# Patient Record
Sex: FEMALE | Race: WHITE
[De-identification: names, ages, dates, MRNs, and addresses within clinical notes are randomized per-mention and may not be internally consistent; named-entity substitution may affect disease eponyms.]

---

## 2020-01-25 ENCOUNTER — HOSPITAL ENCOUNTER (INPATIENT)
Dept: HOSPITAL 1 - ED | Age: 85
LOS: 7 days | Discharge: HOME | DRG: 871 | End: 2020-02-01
Attending: FAMILY MEDICINE | Admitting: FAMILY MEDICINE
Payer: COMMERCIAL

## 2020-01-25 VITALS
HEIGHT: 62 IN | HEIGHT: 62 IN | BODY MASS INDEX: 21.71 KG/M2 | BODY MASS INDEX: 21.71 KG/M2 | WEIGHT: 118 LBS | WEIGHT: 118 LBS

## 2020-01-25 DIAGNOSIS — I11.0: ICD-10-CM

## 2020-01-25 DIAGNOSIS — E03.9: ICD-10-CM

## 2020-01-25 DIAGNOSIS — Z87.891: ICD-10-CM

## 2020-01-25 DIAGNOSIS — A41.9: Primary | ICD-10-CM

## 2020-01-25 DIAGNOSIS — E78.5: ICD-10-CM

## 2020-01-25 DIAGNOSIS — N17.0: ICD-10-CM

## 2020-01-25 DIAGNOSIS — F03.90: ICD-10-CM

## 2020-01-25 DIAGNOSIS — G40.909: ICD-10-CM

## 2020-01-25 DIAGNOSIS — E86.0: ICD-10-CM

## 2020-01-25 DIAGNOSIS — J96.01: ICD-10-CM

## 2020-01-25 DIAGNOSIS — I73.9: ICD-10-CM

## 2020-01-25 DIAGNOSIS — R65.20: ICD-10-CM

## 2020-01-25 DIAGNOSIS — I21.A1: ICD-10-CM

## 2020-01-25 DIAGNOSIS — E43: ICD-10-CM

## 2020-01-25 DIAGNOSIS — I16.0: ICD-10-CM

## 2020-01-25 DIAGNOSIS — H91.8X2: ICD-10-CM

## 2020-01-25 DIAGNOSIS — J69.0: ICD-10-CM

## 2020-01-25 DIAGNOSIS — J44.9: ICD-10-CM

## 2020-01-25 LAB
ALBUMIN SERPL-MCNC: 2.5 G/DL (ref 3.4–5)
ALP SERPL-CCNC: 165 U/L (ref 46–116)
ALT SERPL-CCNC: 39 U/L (ref 14–59)
AST SERPL-CCNC: 37 U/L (ref 15–37)
BASOPHILS NFR BLD: 0 % (ref 0–2)
BILIRUB SERPL-MCNC: 0.9 MG/DL (ref 0.2–1)
BUN SERPL-MCNC: 22 MG/DL (ref 7–18)
CALCIUM SERPL-MCNC: 8.7 MG/DL (ref 8.5–10.1)
CHLORIDE SERPL-SCNC: 105 MMOL/L (ref 98–107)
CO2 SERPL-SCNC: 25 MMOL/L (ref 21–32)
CREAT SERPL-MCNC: 1.4 MG/DL (ref 0.6–1)
ERYTHROCYTE [DISTWIDTH] IN BLOOD BY AUTOMATED COUNT: 14.1 % (ref 11.5–14.5)
GFR SERPLBLD BASED ON 1.73 SQ M-ARVRAT: (no result) ML/MIN
GLUCOSE SERPL-MCNC: 172 MG/DL (ref 74–106)
PLATELET # BLD: 224 X10^3MCL (ref 130–400)
POTASSIUM SERPL-SCNC: 3.6 MMOL/L (ref 3.5–5.1)
PROT SERPL-MCNC: 5.8 G/DL (ref 6.4–8.2)
SODIUM SERPL-SCNC: 142 MMOL/L (ref 136–145)

## 2020-01-25 PROCEDURE — G0378 HOSPITAL OBSERVATION PER HR: HCPCS

## 2020-01-25 NOTE — NUR
REC'D A 87/F IN [facility] WITH C/O SOB WITH DRY COUGH X 2 DAYS. HX OF COPD,
CURRENTLY A SMOKER, AND ASTHMA. PT AAOX4, RESP EVEN AND LABORED, ARELI
WHEEZING NOTED, PLACED ON 02 @1L.  PT ON CM, IN MILD DISTRESS.

## 2020-01-26 VITALS — DIASTOLIC BLOOD PRESSURE: 56 MMHG | SYSTOLIC BLOOD PRESSURE: 124 MMHG

## 2020-01-26 VITALS — SYSTOLIC BLOOD PRESSURE: 119 MMHG | DIASTOLIC BLOOD PRESSURE: 54 MMHG

## 2020-01-26 VITALS — DIASTOLIC BLOOD PRESSURE: 40 MMHG | SYSTOLIC BLOOD PRESSURE: 118 MMHG

## 2020-01-26 VITALS — DIASTOLIC BLOOD PRESSURE: 63 MMHG | SYSTOLIC BLOOD PRESSURE: 126 MMHG

## 2020-01-26 VITALS — DIASTOLIC BLOOD PRESSURE: 42 MMHG | SYSTOLIC BLOOD PRESSURE: 128 MMHG

## 2020-01-26 VITALS — DIASTOLIC BLOOD PRESSURE: 48 MMHG | SYSTOLIC BLOOD PRESSURE: 99 MMHG

## 2020-01-26 LAB
BASOPHILS NFR BLD: 0 % (ref 0–2)
BUN SERPL-MCNC: 21 MG/DL (ref 7–18)
CALCIUM SERPL-MCNC: 7.8 MG/DL (ref 8.5–10.1)
CHLORIDE SERPL-SCNC: 108 MMOL/L (ref 98–107)
CHOLEST SERPL-MCNC: 92 MG/DL (ref ?–200)
CHOLEST/HDLC SERPL: 1.9 MG/DL
CO2 SERPL-SCNC: 20.9 MMOL/L (ref 21–32)
CREAT SERPL-MCNC: 1.5 MG/DL (ref 0.6–1)
ERYTHROCYTE [DISTWIDTH] IN BLOOD BY AUTOMATED COUNT: 14.6 % (ref 11.5–14.5)
GFR SERPLBLD BASED ON 1.73 SQ M-ARVRAT: (no result) ML/MIN
GLUCOSE SERPL-MCNC: 267 MG/DL (ref 74–106)
HDLC SERPL-MCNC: 49 MG/DL (ref 40–60)
MAGNESIUM SERPL-MCNC: 1.8 MG/DL (ref 1.8–2.4)
PHOSPHATE SERPL-MCNC: 2.3 MG/DL (ref 2.5–4.9)
PLATELET # BLD: 195 X10^3MCL (ref 130–400)
POTASSIUM SERPL-SCNC: 3.5 MMOL/L (ref 3.5–5.1)
SODIUM SERPL-SCNC: 142 MMOL/L (ref 136–145)
T3 SERPL-MCNC: 0.36 NG/ML
T3RU NFR SERPL: 41 % UPTAKE (ref 30–39)
T4 FREE SERPL-MCNC: 1.7 NG/DL (ref 0.76–1.46)
T4 SERPL-MCNC: 9.6 UG/DL (ref 4.7–13.3)
T4/T3 UPTAKE INDEX SERPL: 3.9 UG/DL (ref 1.4–4.5)
TRIGL SERPL-MCNC: 66 MG/DL (ref ?–150)

## 2020-01-26 NOTE — NUR
DOCTOR PURNIMA AND MEDICAL TEAM AT BEDSIDE FOR AM ROUND. PATIENT AND HER SON AT
BEDSIDE MADE AWARE OF CURRENT CONDITION AND PLAN OF CARE.

## 2020-01-26 NOTE — NUR
RECEIVED PT IN BED, ALERT AD ORIENTED. ABLE TO VERBALIZE NEEDS. RESP. EVEN AND
UNLABORED. ON 02 AT 3L/MIN VIA NC, LUNG SOUNDS DIM. HOB ELEVATED , NO ACUTE
DISTRESS NOTED. AFEBRILE AND VITAL SIGNS STABLE. ST WITH OCCA. PACS ON THE
MONITOR, DENIES CP OR ANY DISCOMFORT AT THIS TIME. HL TO LFA, INTACT AND
PATENT. VOIDS FREELY VIA BSC WITH ASSIST. HS CARE DONE.. CALL LIGHT WITHIN
REACH. WILL CONTINUE TO MONITOR.

## 2020-01-26 NOTE — NUR
NOTED NEW ORDER FOR IVF NS 1 LITER BOLUS IN 1 HOUR. IVF NS STARTED AT THIS
TIME. IV SITE TO RIGHT HAND INTACT AND PATENT. DENIES PAIN. O2 2LPM N/C
MAINTAINED.

## 2020-01-26 NOTE — NUR
STATED IV SITE RIGHT HAND HURTING, NO INFILTRATION OR ERYTHEMA NOTED, IV
CATHETER REMOVED WITH CATHETER INTACT, DRSG APPLIED. IV CATHETER#22 INSERTED
TO LFA WITH GOOD BLD RETURNED AND FLUSHED WELL.

## 2020-01-26 NOTE — NUR
ASSISTED TO BSC NOTED WITH MILD SOB. VOIDED. ASSISTED BACK TO BED. O2 2LPM N/C
MAINTAINED. DENIES PAIN. SON AT BEDSIDE.

## 2020-01-26 NOTE — NUR
PT CONTINUES TO HAVE FREQUENT RUNS OF PACS, HR INCREASES TO 180S AND DROPS
BACK DOWN TO 120S. DR. BREWSTER MADE AWARE. NO FURTHER ORDERS AT THIS TIME. WILL
CONTINUE TO MONITOR.

## 2020-01-26 NOTE — NUR
RECIEVED PT FROM [facility]. PT RESTING IN BED. RR EVEN AND UNLABORED, NO SIGNS
OF ACUTE DISTRESS. BED IN LOWEST POSITION AND CALL LIGHT WITHIN REACH. WILL
CONTINUE TO MONITOR.

## 2020-01-26 NOTE — NUR
SEEN IN BED AAOX4. ON O2 2LPM N/C. NO RESP DISTRESS NOTED. RT PROTOCAL.
DIMINISHED LUNG SOUND. IVF NS TO RT WIRST INFUSING AT 126ML/HR. DENIES PAIN AT
THIS TIME. CALL LIGHT PLACED WITHIN EASY REACH. SIDERAILS UP X2.

## 2020-01-27 VITALS — SYSTOLIC BLOOD PRESSURE: 114 MMHG | DIASTOLIC BLOOD PRESSURE: 55 MMHG

## 2020-01-27 VITALS — SYSTOLIC BLOOD PRESSURE: 110 MMHG | DIASTOLIC BLOOD PRESSURE: 41 MMHG

## 2020-01-27 VITALS — SYSTOLIC BLOOD PRESSURE: 120 MMHG | DIASTOLIC BLOOD PRESSURE: 54 MMHG

## 2020-01-27 VITALS — DIASTOLIC BLOOD PRESSURE: 75 MMHG | SYSTOLIC BLOOD PRESSURE: 126 MMHG

## 2020-01-27 VITALS — DIASTOLIC BLOOD PRESSURE: 72 MMHG | SYSTOLIC BLOOD PRESSURE: 117 MMHG

## 2020-01-27 LAB
BASOPHILS NFR BLD: 0 % (ref 0–2)
BUN SERPL-MCNC: 28 MG/DL (ref 7–18)
CALCIUM SERPL-MCNC: 8.7 MG/DL (ref 8.5–10.1)
CHLORIDE SERPL-SCNC: 108 MMOL/L (ref 98–107)
CO2 SERPL-SCNC: 21.8 MMOL/L (ref 21–32)
CREAT SERPL-MCNC: 1.5 MG/DL (ref 0.6–1)
DACRYOCYTES BLD QL SMEAR: (no result)
ERYTHROCYTE [DISTWIDTH] IN BLOOD BY AUTOMATED COUNT: 13.9 % (ref 11.5–14.5)
GFR SERPLBLD BASED ON 1.73 SQ M-ARVRAT: (no result) ML/MIN
GLUCOSE SERPL-MCNC: 180 MG/DL (ref 74–106)
MAGNESIUM SERPL-MCNC: 2 MG/DL (ref 1.8–2.4)
MONOCYTES NFR BLD: 1 % (ref 0–7)
NEUTS BAND NFR BLD: 10 % (ref 0–10)
NEUTS SEG NFR BLD MANUAL: 84 % (ref 37–75)
PHOSPHATE SERPL-MCNC: 3.4 MG/DL (ref 2.5–4.9)
PLAT MORPH BLD: (no result)
PLATELET # BLD: 216 X10^3MCL (ref 130–400)
POTASSIUM SERPL-SCNC: 3.8 MMOL/L (ref 3.5–5.1)
RBC MORPH BLD: (no result)
SODIUM SERPL-SCNC: 141 MMOL/L (ref 136–145)

## 2020-01-27 NOTE — NUR
NO COMPLAINTS NOTED AT THIS TIME. RESTING QUIETLY. EYES CLOSED, APPEARS
ASLEEP, EASILY AROUSABLE. 02 IN PLACE, NO SOB NOTED. NO ACUTE DISTRESS NOTED.
CALL LIGHT WITHIN REACH. WILL CONTINUE TO MONITOR.

## 2020-01-27 NOTE — NUR
AAO TIMES 4. TELE # 26 . LUNGS CLEAR BUT SOMEWHAT DIMINISHED BUE,
DIMINISHED BLL. O2 3L NC, O2 SAT 91%. BS'S ACTIVE TIMES 4. PERIPHERAL PULSES
PALPABLE. NO EDEMA. IV SITE CDI. COOPERATIVE. GOOD APPETITE. NO C/O PAIN.

## 2020-01-27 NOTE — NUR
RECEIVED PT IN BED AWAKE, ALERT, ORIENTED X4. SPEECH CLEAR. ABLE TO MAKE NEEDS
KNOWN. PT IS [place] TO LEFT EAR. NO CHEST PAIN NOTED. DENIES PRESSURE. LUNG
SOUNDS DIMINISHED, PT ON 3L O2 VIA N/C. PT ON RT PROTOCAL. BS ACTIVE IN ALL
FOUR QUADS. NO ABD PAIN NOTED. PT USES BEDPAN BUT IS INCONTINENT OF URINE AT
TIMES. GENERALIZED WEAKNESS NOTED. BEDREST FOR NOW. FULL ROM, PASSIVE. ABLE TO
REPOSITION SELF WITH SOME ASSISTANCE.  PT ON SEIZURE PRECAUTIONS. ECCHYMOSIS
TO BUE, STERI STRIPS TO LFA, DRY BLOOD NOTED. IV TO RIGHT HAND, INTACT. SHIFT
ASSESSMENT COMPLETED. CALL LIGHT WITHIN REACH. BED IS IN LOWEST POSITION. WILL
CONTINUE TO MONITOR CLOSELY.

## 2020-01-27 NOTE — NUR
AAO TIMES 4. MED SURG PATIENT. SOB WITH ACTIVITY. O2 3L NC, O2 SAT 94%. BS'S
ACTIVE TIMES 4. IV SITE CDI. COOPERATIVE. NO C/O PAIN. EATING DINNER.

## 2020-01-27 NOTE — NUR
AFEBRILE AND VITAL SIGNS STABLE. RESP. EVEN AND UNLABORED. 02 IN PLACE, OCCA.
NON PRODUCTIVE COUGH NOTED. DUE MEDS GIVEN AS ORDERED, CHEMA. WELL. VOIDING
FREELY VIA BSC WITH ASSIST. KEPT COMFORTABLE. CALL LIGHT WITHIN REACH. WILL
CONTINUE TO MONITOR.

## 2020-01-28 VITALS — SYSTOLIC BLOOD PRESSURE: 131 MMHG | DIASTOLIC BLOOD PRESSURE: 69 MMHG

## 2020-01-28 VITALS — SYSTOLIC BLOOD PRESSURE: 110 MMHG | DIASTOLIC BLOOD PRESSURE: 63 MMHG

## 2020-01-28 VITALS — SYSTOLIC BLOOD PRESSURE: 121 MMHG | DIASTOLIC BLOOD PRESSURE: 42 MMHG

## 2020-01-28 VITALS — DIASTOLIC BLOOD PRESSURE: 56 MMHG | SYSTOLIC BLOOD PRESSURE: 123 MMHG

## 2020-01-28 VITALS — DIASTOLIC BLOOD PRESSURE: 52 MMHG | SYSTOLIC BLOOD PRESSURE: 107 MMHG

## 2020-01-28 LAB
BASOPHILS NFR BLD: 0.1 % (ref 0–2)
BUN SERPL-MCNC: 37 MG/DL (ref 7–18)
CALCIUM SERPL-MCNC: 8.5 MG/DL (ref 8.5–10.1)
CHLORIDE SERPL-SCNC: 105 MMOL/L (ref 98–107)
CO2 SERPL-SCNC: 22.9 MMOL/L (ref 21–32)
CREAT SERPL-MCNC: 1.6 MG/DL (ref 0.6–1)
ERYTHROCYTE [DISTWIDTH] IN BLOOD BY AUTOMATED COUNT: 14.3 % (ref 11.5–14.5)
GFR SERPLBLD BASED ON 1.73 SQ M-ARVRAT: (no result) ML/MIN
GLUCOSE SERPL-MCNC: 156 MG/DL (ref 74–106)
PLATELET # BLD: 242 X10^3MCL (ref 130–400)
POTASSIUM SERPL-SCNC: 4.1 MMOL/L (ref 3.5–5.1)
SODIUM SERPL-SCNC: 138 MMOL/L (ref 136–145)

## 2020-01-28 NOTE — NUR
SHE WAS PICKING AT THE STERI STRIPS ON HER LFA. THE SON ASKED IT TO BE
COVERED. I REMOVED THE STERI STRIPS CAREFULLY AND IRRIGATED WITH NS, THEN
DRIED IT THEN APPLIED VERSATEL DRESSING FOR SKIN TEARS. THEN I APPLIED A GAUZE
DRESSING WRAP TO COVER UP THE SITE FROM HER. SITE CDI.

## 2020-01-28 NOTE — NUR
PATIENT AWAKE, ALERT, ORIENTED X3. HARD OF HEARING TO LEFT EAR. RESPIRATION
EVEN AND UNLABORED, ON O2 2L PER NASAL CANNULA. SALINE LOCK TO RIGHT HAND
PATENT AND INTACT. NO EDEMA NOTED. LBM 1/25/20. INCONTINENT OF URINE AT TIMES,
USES BEDPAN. GENERALIZED WEAKNESS TO EXTREMITIES. ECCHYMOSIS TO  BUE, SKIN
TEAR TO LFA COVERED WITH D/I DRESSING. DENIES PAIN AT THIS TIME. WILL CONTINUE
TO MONITOR.

## 2020-01-28 NOTE — NUR
AAO TO PERSON AND PLACE. MED SURG PATIENT. RFA IV SITE CDI. HER SON VISITED
TODAY. LFA IV DRESSING CDI. NO SOB. NO C/O PAIN. COOPERATIVE. WATCHING TV.

## 2020-01-28 NOTE — NUR
PT AWAKE, TRYING TO GET OOB, ORIENTED PT TO ROOM AND SURROUNDINGS. INSTRUCTED
PT NOT TO GET OOB WITHOUT ASSISTANCE. BED ALARM ON. CALL LIGHT WITHIN REACH.
WILL CONTINUE TO MONITOR CLOSELY.

## 2020-01-28 NOTE — NUR
LINEN AND GOWN CHANGED. ALL DUE MEDS GIVEN AS ORDERED. PT RESTING IN BED. WILL
ENDORSE TO INCOMING SHIFT.

## 2020-01-28 NOTE — NUR
AAO TIMES 4. MED SURG PATIENT. LUNGS DIMINISHED BILATERALLY. O2 SAT ON 2L NC
93%. BS'S ACTIVE TIMES 4. [place] WITH GENERALIZED WEAKNESS. PERIPHERAL PULSES
PALPABLE.NO EDEMA. ON HEPARIN SQ. COOPERATIVE. NO C/O PAIN.

## 2020-01-29 VITALS — SYSTOLIC BLOOD PRESSURE: 134 MMHG | DIASTOLIC BLOOD PRESSURE: 65 MMHG

## 2020-01-29 VITALS — SYSTOLIC BLOOD PRESSURE: 121 MMHG | DIASTOLIC BLOOD PRESSURE: 58 MMHG

## 2020-01-29 VITALS — SYSTOLIC BLOOD PRESSURE: 128 MMHG | DIASTOLIC BLOOD PRESSURE: 53 MMHG

## 2020-01-29 VITALS — SYSTOLIC BLOOD PRESSURE: 119 MMHG | DIASTOLIC BLOOD PRESSURE: 66 MMHG

## 2020-01-29 VITALS — DIASTOLIC BLOOD PRESSURE: 58 MMHG | SYSTOLIC BLOOD PRESSURE: 121 MMHG

## 2020-01-29 VITALS — SYSTOLIC BLOOD PRESSURE: 134 MMHG | DIASTOLIC BLOOD PRESSURE: 67 MMHG

## 2020-01-29 LAB
BASOPHILS NFR BLD: 0 % (ref 0–2)
BUN SERPL-MCNC: 41 MG/DL (ref 7–18)
CALCIUM SERPL-MCNC: 8.5 MG/DL (ref 8.5–10.1)
CHLORIDE SERPL-SCNC: 109 MMOL/L (ref 98–107)
CO2 SERPL-SCNC: 25.5 MMOL/L (ref 21–32)
CREAT SERPL-MCNC: 1.6 MG/DL (ref 0.6–1)
ERYTHROCYTE [DISTWIDTH] IN BLOOD BY AUTOMATED COUNT: 13.8 % (ref 11.5–14.5)
GFR SERPLBLD BASED ON 1.73 SQ M-ARVRAT: (no result) ML/MIN
GLUCOSE SERPL-MCNC: 149 MG/DL (ref 74–106)
PLATELET # BLD: 273 X10^3MCL (ref 130–400)
POTASSIUM SERPL-SCNC: 4.5 MMOL/L (ref 3.5–5.1)
SODIUM SERPL-SCNC: 142 MMOL/L (ref 136–145)

## 2020-01-29 NOTE — NUR
1. Recommend continuing cardiac diet.
2. Recommend ONS Ensure High protein BID.
Discussed with MARQUITA Torres.

## 2020-01-29 NOTE — NUR
Initial Nutrition Assessment: 236/B NUNU, STEVIE RIGGINS IA HR
 
 Dx: Sepsis, PNA
 PMHx: COPD, CHF, HTN, PAD, seizure disorder, and Unilateral left ear Deafness
 PSHx: Cataract Removal, Other (2 Endarterectomies LLE)
Labs:  BG 149H, BUN 41H, CREAT 1.6H, WBC 12.4H, HGB 9.6L
 Meds: Colace, lactinex, Lipitor, neutronin, Pepcid, Zofran, zosyn, heaprin
Diet: cardiac
PO intake since admission: (1/29) breakfast 100%, (1/28) dinner, breakfast
50%, lunch 40%, (1/27) dinner 50%, lunch 40%, breakfast 75%
 Ht: 157.48 cm (62")              Wt: 53.5 kg (118#)           BMI: 21.6 kg/m2
         Bed scale: 134#
IBW: 110# (50 kg) %IBW: 107 UBW: unable to access
 Age: 87/F
 Food Allergies: NKFA
 Skin: ecchymosis to BUE, skin tear L forearm    Silver: 16
 Edema: none
 GI: Last BM: 1/25
Per H&P, Pt is a 87 yoF with PMH of COPD, CHF, HTN, PAD, seizure disorder, and
Unilateral left ear Deafness who came with cc of SOB wince 2 days, reported at
rest, aggravated by exertion including eating/talking, alleviated with home
nebulizer treatment yesterday.
 
RD Note (1/29): Patient was sleeping. Per RN Minda, pt has good PO and does
not have any N/V/D/C at this time. Per progress note (1/28) Awake, alert on O2
1L via n/c c/o unable to expectorate phlegm.
 
 
 Problem with:  N/V/D/C: none
 Problems with: Chewing:  Swallowing: none
 Current appetite: good per RN
Recent wt change: unable to access  %wt change: N/A
Vitamin/Supplement use: unable to access
Special diet at home: unable to access
Physical activity: unable to access
Nutrition education given: not possible at this time
 Food-drug interactions: none Education given: n/a
 
 Estimated Nutritional Needs Based on current body weight (53.5 kg)
 Energy: 7223-7210 kcal/day (30-35 kcal/kg for sepsis)
 Protein: 64-75 g/day (1.2-1.4 g/kg for sepsis)
 Fluid: 1010-6628 mL/day (1 mL/kcal)
 
Nutrition Diagnosis:
1. Increased nutrient needs related to increased metabolic demands as
evidenced by sepsis.
 
Intervention
1. Recommend continuing cardiac diet.
2. Recommend ONS Ensure High protein BID.
Discussed with NP Melissa.
 
Monitor/Evaluate
 Goal: PO intake at least 75% of estimated needs
 Monitor: PO intake, Labs, GI function
 F/U in 3-5 days as moderate risk 2/1-3

## 2020-01-29 NOTE — NUR
TOLERATED TO DIET WELL. HAD BM X1 SOFT BM. INCONTINENCE AT TIMES. UP WITH
PHYSICAL THERAPY TODAY. O2 1LPM N/C MAINTAINED. RT PROTOCAL. ALL NEEDS
ATTENDED. S/L TO RFA INTACT AND PATENT. DENIES PAIN.

## 2020-01-29 NOTE — NUR
**********PHYSICAL THERAPY DAILY NOTES CO-SIGN**********
All documentation done by the Physical Therapy Assistant for 01/29/20
has been reviewed. I agree with the documentation.
 
Reviewed/Co-Signed by: Joselyn Garcia  PT
Documentation Done by: EMILIE LAWRENCE, PTA

## 2020-01-29 NOTE — NUR
PT RESTING COMFORTABLY IN BED WITH EYES CLOSED. NO ACUTE DISTRESS NOTED. EVEN
AND UNLABORED RESPIRATIONS ON 1LNC. MEDSURG PT. IVL INTACT. BED IN LOWEST
POSITION. SEIZURE PRECAUTION IN PLACE. CALL LIGHT WITHIN REACH. WILL CONTINUE
TO MONITOR.

## 2020-01-29 NOTE — NUR
PATIENT RESTING IN BED. RESPIRATION EVEN AND UNLABORED, ON O2 2L PER NASAL
CANNULA. IV SITE NO SIGN OF INFILTRATION. ASSISTED WITH NEEDS. KEPT CLEAN AND
DRY. SAFETY OBSERVED. PLACED BED IN THE LOWEST POSITION. PLACED CALL LIGHT
WITHIN REACH AT ALL TIMES.

## 2020-01-29 NOTE — NUR
SEEN IN BED AAOX4. [place]. POOR VISION, WEARING GLASSES. STATED HAVING SOB, ON O2
2LPN N/C. RT NOTIFIED FOR BREATHING TREATMENT. DIMINISHED LUNG SOUND. GEN
BODY WEAKNESS. USES BSC WITH ASSISTANCE. S/L TO RFA INTACT AND PATENT. CALL
LIGHT PLACED WITHIN EASY REACH. SIDERAILS UP X2. NOTED FINISHED 80% OF CCHO
DIET.

## 2020-01-30 VITALS — DIASTOLIC BLOOD PRESSURE: 64 MMHG | SYSTOLIC BLOOD PRESSURE: 133 MMHG

## 2020-01-30 VITALS — DIASTOLIC BLOOD PRESSURE: 49 MMHG | SYSTOLIC BLOOD PRESSURE: 129 MMHG

## 2020-01-30 VITALS — SYSTOLIC BLOOD PRESSURE: 133 MMHG | DIASTOLIC BLOOD PRESSURE: 49 MMHG

## 2020-01-30 VITALS — SYSTOLIC BLOOD PRESSURE: 133 MMHG | DIASTOLIC BLOOD PRESSURE: 63 MMHG

## 2020-01-30 VITALS — SYSTOLIC BLOOD PRESSURE: 120 MMHG | DIASTOLIC BLOOD PRESSURE: 42 MMHG

## 2020-01-30 LAB
AMPHETAMINES UR QL SCN: (no result)
BASOPHILS NFR BLD: 0.1 % (ref 0–2)
BUN SERPL-MCNC: 48 MG/DL (ref 7–18)
CALCIUM SERPL-MCNC: 8.5 MG/DL (ref 8.5–10.1)
CHLORIDE SERPL-SCNC: 107 MMOL/L (ref 98–107)
CO2 SERPL-SCNC: 26.1 MMOL/L (ref 21–32)
CREAT SERPL-MCNC: 1.5 MG/DL (ref 0.6–1)
ERYTHROCYTE [DISTWIDTH] IN BLOOD BY AUTOMATED COUNT: 14.2 % (ref 11.5–14.5)
GFR SERPLBLD BASED ON 1.73 SQ M-ARVRAT: (no result) ML/MIN
GLUCOSE SERPL-MCNC: 156 MG/DL (ref 74–106)
MICROSCOPIC UR-IMP: NO
PLATELET # BLD: 319 X10^3MCL (ref 130–400)
POTASSIUM SERPL-SCNC: 5 MMOL/L (ref 3.5–5.1)
RBC # UR STRIP.AUTO: NEGATIVE /UL
SODIUM SERPL-SCNC: 140 MMOL/L (ref 136–145)
UA SPECIFIC GRAVITY: 1.01 (ref 1–1.03)

## 2020-01-30 NOTE — NUR
WALKED ON THE HALLWAY IN FRONT OF THE ROOM USING WALKER ASSISTED BY PHYSICAL
THERAPIST WITH O2 2LPM MAINTAINED. NOTED PATIENT EASILY GET SHORTNESS OF
BREATH WITH ACTIVITIES. PATIENT BROUGHT BACK TO BED, O2 2LPM MAINTAINED AT
THIS TIME. HOB ELEVATED AT 30DEG. WILL CONTINUE TO MONITOR.

## 2020-01-30 NOTE — NUR
CARE ASSUMED FROM OUTGOING RN. PT RESTING COMFORTABLY IN BED WITH EYES CLOSED.
NO ACUTE DISTRESS NOTED. EVEN AND UNLABORED RESPIRATIONS ON 2LNC. MEDSURG PT.
IV PATENT AND INTACT, TKO AT THIS TIME. NO S/S OF PAIN. BED IN LOWEST
POSITION. SIDE RAILS UPX2. CALL LIGHT WITHIN REACH. WILL CONTINUE TO MONITOR.

## 2020-01-30 NOTE — NUR
SEEN SITTING UP IN BED HAVING BREAKFAST, ABLE TO FEED SELF, NO ASPIRATION
NOTED. DENIES PAIN. PLAN OF CARE DISCUSSED, PATIENT VERBALIZED UNDERSTANDING.

## 2020-01-30 NOTE — NUR
PT RESTED COMFORTABLY IN INTERVALS THROUGHOUT THE SHIFT. ALL NEEDS TENDED TO
AND MET. ALL SCHEDULED MEDICATIONS GIVEN. EVEN AND UNLABORED RESPIRATIONS ON
1LNC, SATTING 91% ON 1LNC, NO C/O SOB. IVL PATENT AND INTACT. NO C/O PAIN. BED
IN LOWEST POSITION. SEIZURE PRECAUTION IN PLACE. SIDE RAILS UPX2. CALL LIGHT
WITHIN REACH. WILL ENDORSE TO ONCOMING SHIFT.

## 2020-01-30 NOTE — NUR
PT RESTING COMFORTABLY IN BED. NO ACUTE DISTRESS NOTED. EVEN AND UNLABORED
RESPIRATIONS ON 1LNC, SATTING AT 92%. NO C/O SOB OR CHEST PAIN. IV INFILTRATED
TO [facility], GA'ED, CATHETER INTACT, PRESSURE APPLIED, NO BLEEDING NOTED. NEW IV
STARTED TO RAC, 22G, WITH GOOD BLOOD RETURN AND FLUSHING WELL. ANTIBIOTICS
ADMINISTERED PER EMAR. BED IN LOWEST POSITION. SIDE RAILS UPX2. CALL LIGHT
WITHIN REACH. WILL CONTINUE TO MONITOR.

## 2020-01-30 NOTE — NUR
SEEN RESTING WITH EYES CLOSED. NO RESP DISTRESS NOTED, O2 1LPM N/C MAINTAINED.
GEN BODY WEAKNESS. S/L TO RAC INTACT AND PATENT. CALL LIGHT NOTED PLACED
WITHIN EASY REACH. SIDERAILS UP X2.

## 2020-01-31 VITALS — SYSTOLIC BLOOD PRESSURE: 123 MMHG | DIASTOLIC BLOOD PRESSURE: 78 MMHG

## 2020-01-31 VITALS — DIASTOLIC BLOOD PRESSURE: 57 MMHG | SYSTOLIC BLOOD PRESSURE: 128 MMHG

## 2020-01-31 VITALS — DIASTOLIC BLOOD PRESSURE: 62 MMHG | SYSTOLIC BLOOD PRESSURE: 138 MMHG

## 2020-01-31 VITALS — DIASTOLIC BLOOD PRESSURE: 46 MMHG | SYSTOLIC BLOOD PRESSURE: 121 MMHG

## 2020-01-31 VITALS — DIASTOLIC BLOOD PRESSURE: 51 MMHG | SYSTOLIC BLOOD PRESSURE: 129 MMHG

## 2020-01-31 LAB
BASOPHILS NFR BLD: 0.9 % (ref 0–2)
ERYTHROCYTE [DISTWIDTH] IN BLOOD BY AUTOMATED COUNT: 14.4 % (ref 11.5–14.5)
PLATELET # BLD: 332 X10^3MCL (ref 130–400)

## 2020-01-31 NOTE — NUR
RECIEVED PT IN BED, AAO X 4. VERBALLY RESPONSIVE.NO C/O SOB NO C/O N/V/D. PT
ON 02 1L VIA NC. DIMINISHED BREATH SOUNDS BLL, UNLABORED BREATHING. PT NON
TELE. NOTED ECCHYMOSIS ON BUE AND LFA SKIN TEAR WITH VERSATEL DRESSING. NO C/O
PAIN AT THIS TIME. USES BED DILL, PT INCONTINENT AT TIMES. HAS HER OWN WALKER
ON BEDSIDE HOWEVER REFUSES TO GO TO RESTROOM AS PT REPORTS WEAKNESS ON L LEG.
IV SALINE LOCKED NOTED ON R AC. EDUCATED PT TO CALL FOR C/O PAIN/ DISCOMFORT.
BED IN LOW POSITION, SIDE RAILS UP X 2. CALL LIGHT WITHIN REACH.

## 2020-01-31 NOTE — NUR
I HAVE REVIEWED THE DATA COLLECTION BY VIRIDIANA TRIPATHI (NAME): CHAS RESENDEZ
ENTERED ON (DATE/TIME):1/31/20 AT 1009
 
I CONCUR WITH THE DATA AND ANY EXCEPTIONS OR COMMENTS ARE LISTED BELOW:

## 2020-01-31 NOTE — NUR
REC'D PT FROM DAY NURSE. PT RESTING IN BED. AAOX4, SPEECH CLEAR, FOLLOWS
COMMANDS. [place]. WEARING GLASSES. MED SURG, NO TELE. DENIES CP, DIZZINESS, OR
PALPITATIONS. DENIES RESP DISTRESS OR SOB. BREATHING EVEN/UNLABORED ON 1L O2
VIA NC, SPO2 90%. O2 TITRATED UP TO 2L, SPO2 93%. DENIES COUGH. ABD
SOFT/ROUND. DENIES ABD PAIN, TENDERNESS, OR N/V. VOIDING WITH INCONTINENCE.
GEN WEAKNESS. UP WITH ASSIST. WALKER AT BEDSIDE. BUE ECCHYMOSIS. LFA SKIN TEAR
WITH VERSATEL DRESSING. DENIES PAIN. IV TO RAC FLUSHED AND PATENT, SITE WNL.
CALL LIGHT WITHIN REACH, BED AT LOWEST POSITION. WILL CONTINUE TO MONITOR.

## 2020-01-31 NOTE — NUR
PT RESTED COMFORTABLY IN INTERVALS THROUGHOUT THE SHIFT. ALL NEEDS TENDED TO
AND MET. ALL SCHEDULED MEDICATIONS GIVEN. EVEN AND UNLABORED RESPIRATIONS
ON 1LNC, SATTING 90%, NO C/O SOB, RT PROTOCOL IN PLACE. IV PATENT AND INTACT.
NO C/O PAIN. VERSATEL DRESSING TO LFA, CDI. BED IN LOWEST POSITION. SIDE RAILS
UPX2. CALL LIGHT WITHIN REACH.  WILL ENDORSE TO ONCOMING SHIFT.

## 2020-01-31 NOTE — NUR
**********PHYSICAL THERAPY DAILY NOTES CO-SIGN**********
All documentation done by the Physical Therapy Assistant for 01/31/20
has been reviewed. I agree with the documentation.
 
Reviewed/Co-Signed by: Joselyn Garcia  PT
Documentation Done by: EMILIE LAWRENCE, PTA

## 2020-01-31 NOTE — NUR
PT RESTING COMFORTABLY IN BED WITH EYES CLOSED. NO ACUTE DISTRESS NOTED. EVEN
AND UNLABORED RESPIRATIONS ON 1LNC. IV PATENT AND INTACT RUNNING ZOSYN PER
EMAR. ASSISTED TO BEDPAN, ABLE TO URINATE WITHOUT DIFFICULTY. NO C/O SOB OR
PAIN AT THIS TIME. BED IN LOWEST POSITION. SIDE RAILS UPX2. CALL LIGHT WITHIN
REACH. WILL CONTINUE TO MONITOR.

## 2020-02-01 VITALS — DIASTOLIC BLOOD PRESSURE: 57 MMHG | SYSTOLIC BLOOD PRESSURE: 131 MMHG

## 2020-02-01 VITALS — SYSTOLIC BLOOD PRESSURE: 131 MMHG | DIASTOLIC BLOOD PRESSURE: 57 MMHG

## 2020-02-01 VITALS — SYSTOLIC BLOOD PRESSURE: 141 MMHG | DIASTOLIC BLOOD PRESSURE: 59 MMHG

## 2020-02-01 LAB
BASOPHILS NFR BLD: 0.2 % (ref 0–2)
BUN SERPL-MCNC: 42 MG/DL (ref 7–18)
CALCIUM SERPL-MCNC: 8.1 MG/DL (ref 8.5–10.1)
CHLORIDE SERPL-SCNC: 107 MMOL/L (ref 98–107)
CO2 SERPL-SCNC: 27.1 MMOL/L (ref 21–32)
CREAT SERPL-MCNC: 1.2 MG/DL (ref 0.6–1)
ERYTHROCYTE [DISTWIDTH] IN BLOOD BY AUTOMATED COUNT: 14.6 % (ref 11.5–14.5)
GFR SERPLBLD BASED ON 1.73 SQ M-ARVRAT: (no result) ML/MIN
GLUCOSE SERPL-MCNC: 85 MG/DL (ref 74–106)
PLATELET # BLD: 317 X10^3MCL (ref 130–400)
POTASSIUM SERPL-SCNC: 4 MMOL/L (ref 3.5–5.1)
SODIUM SERPL-SCNC: 140 MMOL/L (ref 136–145)

## 2020-02-01 NOTE — NUR
PT'S SON CAME; PT  WAS DC TO HOME PER DC ORDER; NO SOB; O2 TANK FOR HOME IN
THE ROOM AND WAS BROUGHT HOME. PT LEFT THE UNIT BY WHEELCHAIR AND ACCOMPANIED
BY SON AND CNA. VERBAL AND WRITTEN DC INSTRUCTIONS GIVEN TO PT'S SON WIHT
VERBAL UNDERSTANDING. COPIES PROVIED. SL ON THE RAC TAKEN OUT AND THE CATH IS
INTACT. PHOTO OF BUE ECCHYMOSIS TAKEN.

## 2020-02-01 NOTE — NUR
AAOX4. NO SOB; O2 AT 2L/NC IN USE. DIM LUNG SOUNDS ARELI ON AUSCULTATION; [place].
SL ON THE RAC INTACT; INFILTRATED SITE. NSG ASSESSMENT DONE; FALL AND SAFETY
PREAUTION REINFORCED. WILL CONTINUE TO MONITOR STATUS.

## 2020-02-01 NOTE — NUR
PT RESTING IN BED WITH EYES CLOSED. AWAKENS WITH VERBAL STIMULI. NO
COMPLAINTS AT THIS TIME. BREATHING EVEN/UNLABORED ON 2L NC, SPO2 93%. NO COUGH
NOTED. DENIES PAIN. NO SIGNIFICANT CHANGES DURING SHIFT. CALL LIGHT WITHIN
REACH, BED AT LOWEST POSITION. WILL ENDORSE TO DAY NURSE.

## 2020-02-01 NOTE — NUR
PT RESTING IN BED WITH EYES CLOSED. SNORING. NO SIGNS OF DISTRESS NOTED.
BREATHING EVEN/UNLABORED ON 2L O2 VIA NC. CALL LIGHT WITHIN REACH, BED AT
LOWEST POSITION. WILL CONTINUE TO MONITOR.

## 2020-02-01 NOTE — NUR
**********PHYSICAL THERAPY DAILY NOTES CO-SIGN**********
All documentation done by the Physical Therapy Assistant for 02/01/20
has been reviewed. I agree with the documentation.
 
Reviewed/Co-Signed by: Swathi Ross PT
Documentation Done by:JANAE VELASCO PTA
 
POC REVIEWED W/ PTA

## 2020-04-01 ENCOUNTER — HOSPITAL ENCOUNTER (INPATIENT)
Dept: HOSPITAL 1 - ED | Age: 85
LOS: 1 days | DRG: 871 | End: 2020-04-02
Attending: HOSPITALIST | Admitting: HOSPITALIST
Payer: COMMERCIAL

## 2020-04-01 VITALS — DIASTOLIC BLOOD PRESSURE: 62 MMHG | SYSTOLIC BLOOD PRESSURE: 130 MMHG

## 2020-04-01 VITALS — DIASTOLIC BLOOD PRESSURE: 70 MMHG | SYSTOLIC BLOOD PRESSURE: 147 MMHG

## 2020-04-01 VITALS
HEIGHT: 63 IN | BODY MASS INDEX: 21.09 KG/M2 | HEIGHT: 63 IN | WEIGHT: 119.05 LBS | BODY MASS INDEX: 21.09 KG/M2 | WEIGHT: 119.05 LBS

## 2020-04-01 VITALS — DIASTOLIC BLOOD PRESSURE: 58 MMHG | SYSTOLIC BLOOD PRESSURE: 117 MMHG

## 2020-04-01 DIAGNOSIS — Z51.5: ICD-10-CM

## 2020-04-01 DIAGNOSIS — J96.22: ICD-10-CM

## 2020-04-01 DIAGNOSIS — I42.9: ICD-10-CM

## 2020-04-01 DIAGNOSIS — J44.1: ICD-10-CM

## 2020-04-01 DIAGNOSIS — I21.A1: ICD-10-CM

## 2020-04-01 DIAGNOSIS — N17.0: ICD-10-CM

## 2020-04-01 DIAGNOSIS — J44.0: ICD-10-CM

## 2020-04-01 DIAGNOSIS — Z79.82: ICD-10-CM

## 2020-04-01 DIAGNOSIS — D63.8: ICD-10-CM

## 2020-04-01 DIAGNOSIS — I48.91: ICD-10-CM

## 2020-04-01 DIAGNOSIS — I11.0: ICD-10-CM

## 2020-04-01 DIAGNOSIS — J96.21: ICD-10-CM

## 2020-04-01 DIAGNOSIS — E11.65: ICD-10-CM

## 2020-04-01 DIAGNOSIS — Z03.818: ICD-10-CM

## 2020-04-01 DIAGNOSIS — Z66: ICD-10-CM

## 2020-04-01 DIAGNOSIS — E43: ICD-10-CM

## 2020-04-01 DIAGNOSIS — J18.9: ICD-10-CM

## 2020-04-01 DIAGNOSIS — E83.51: ICD-10-CM

## 2020-04-01 DIAGNOSIS — A41.9: Primary | ICD-10-CM

## 2020-04-01 DIAGNOSIS — I50.9: ICD-10-CM

## 2020-04-01 LAB
ALBUMIN SERPL-MCNC: 2.8 G/DL (ref 3.4–5)
ALP SERPL-CCNC: 89 U/L (ref 46–116)
ALT SERPL-CCNC: 28 U/L (ref 14–59)
AMPHETAMINES UR QL SCN: (no result)
AST SERPL-CCNC: 42 U/L (ref 15–37)
BASOPHILS NFR BLD: 0.2 % (ref 0–2)
BILIRUB SERPL-MCNC: 0.4 MG/DL (ref 0.2–1)
BUN SERPL-MCNC: 26 MG/DL (ref 7–18)
CALCIUM SERPL-MCNC: 8.4 MG/DL (ref 8.5–10.1)
CHLORIDE SERPL-SCNC: 104 MMOL/L (ref 98–107)
CHOLEST SERPL-MCNC: 113 MG/DL (ref ?–200)
CHOLEST/HDLC SERPL: 2.1 MG/DL
CK MB SERPL-MCNC: 1 NG/ML (ref 0–3.6)
CK SERPL-CCNC: 61 U/L (ref 26–192)
CO2 SERPL-SCNC: 23.9 MMOL/L (ref 21–32)
CREAT SERPL-MCNC: 1.2 MG/DL (ref 0.6–1)
CRP SERPL-MCNC: 15.4 MG/DL (ref ?–0.9)
ERYTHROCYTE [DISTWIDTH] IN BLOOD BY AUTOMATED COUNT: 15.3 % (ref 11.5–14.5)
ERYTHROCYTE [SEDIMENTATION RATE] IN BLOOD BY WESTERGREN METHOD: 69 MM/HR (ref 0–30)
GFR SERPLBLD BASED ON 1.73 SQ M-ARVRAT: (no result) ML/MIN
GLUCOSE SERPL-MCNC: 185 MG/DL (ref 74–106)
HDLC SERPL-MCNC: 54 MG/DL (ref 40–60)
MICROSCOPIC UR-IMP: YES
PLATELET # BLD: 205 X10^3MCL (ref 130–400)
POTASSIUM SERPL-SCNC: 3.9 MMOL/L (ref 3.5–5.1)
PROT SERPL-MCNC: 6.5 G/DL (ref 6.4–8.2)
RBC # UR STRIP.AUTO: (no result) /UL
SODIUM SERPL-SCNC: 140 MMOL/L (ref 136–145)
T3 SERPL-MCNC: 0.68 NG/ML
T3RU NFR SERPL: 41 % UPTAKE (ref 30–39)
T4 FREE SERPL-MCNC: 1.57 NG/DL (ref 0.76–1.46)
T4 SERPL-MCNC: 10.5 UG/DL (ref 4.7–13.3)
T4/T3 UPTAKE INDEX SERPL: 4.3 UG/DL (ref 1.4–4.5)
TRIGL SERPL-MCNC: 84 MG/DL (ref ?–150)
UA SPECIFIC GRAVITY: >=1.03 (ref 1–1.03)

## 2020-04-01 PROCEDURE — U0002 COVID-19 LAB TEST NON-CDC: HCPCS

## 2020-04-01 PROCEDURE — 5A1935Z RESPIRATORY VENTILATION, LESS THAN 24 CONSECUTIVE HOURS: ICD-10-PCS | Performed by: INTERNAL MEDICINE

## 2020-04-01 PROCEDURE — 05HP33Z INSERTION OF INFUSION DEVICE INTO RIGHT EXTERNAL JUGULAR VEIN, PERCUTANEOUS APPROACH: ICD-10-PCS | Performed by: EMERGENCY MEDICINE

## 2020-04-01 PROCEDURE — A4628 OROPHARYNGEAL SUCTION CATH: HCPCS

## 2020-04-01 PROCEDURE — 0BH17EZ INSERTION OF ENDOTRACHEAL AIRWAY INTO TRACHEA, VIA NATURAL OR ARTIFICIAL OPENING: ICD-10-PCS | Performed by: EMERGENCY MEDICINE

## 2020-04-01 PROCEDURE — G0378 HOSPITAL OBSERVATION PER HR: HCPCS

## 2020-04-02 VITALS — DIASTOLIC BLOOD PRESSURE: 52 MMHG | SYSTOLIC BLOOD PRESSURE: 91 MMHG

## 2020-04-02 VITALS — SYSTOLIC BLOOD PRESSURE: 88 MMHG | DIASTOLIC BLOOD PRESSURE: 49 MMHG

## 2020-04-02 VITALS — DIASTOLIC BLOOD PRESSURE: 52 MMHG | SYSTOLIC BLOOD PRESSURE: 103 MMHG

## 2020-04-02 VITALS — SYSTOLIC BLOOD PRESSURE: 106 MMHG | DIASTOLIC BLOOD PRESSURE: 52 MMHG

## 2020-04-02 VITALS — DIASTOLIC BLOOD PRESSURE: 51 MMHG | SYSTOLIC BLOOD PRESSURE: 104 MMHG

## 2020-04-02 VITALS — DIASTOLIC BLOOD PRESSURE: 66 MMHG | SYSTOLIC BLOOD PRESSURE: 133 MMHG

## 2020-04-02 VITALS — DIASTOLIC BLOOD PRESSURE: 54 MMHG | SYSTOLIC BLOOD PRESSURE: 96 MMHG

## 2020-04-02 VITALS — SYSTOLIC BLOOD PRESSURE: 118 MMHG | DIASTOLIC BLOOD PRESSURE: 83 MMHG

## 2020-04-02 LAB
ALBUMIN SERPL-MCNC: 2.2 G/DL (ref 3.4–5)
ALP SERPL-CCNC: 88 U/L (ref 46–116)
ALT SERPL-CCNC: 30 U/L (ref 14–59)
AST SERPL-CCNC: 77 U/L (ref 15–37)
BASOPHILS NFR BLD: 0.4 % (ref 0–2)
BILIRUB DIRECT SERPL-MCNC: 0.05 MG/DL (ref 0–0.2)
BILIRUB SERPL-MCNC: 0.62 MG/DL (ref 0.2–1)
BUN SERPL-MCNC: 31 MG/DL (ref 7–18)
CALCIUM SERPL-MCNC: 8 MG/DL (ref 8.5–10.1)
CHLORIDE SERPL-SCNC: 107 MMOL/L (ref 98–107)
CO2 SERPL-SCNC: 22.9 MMOL/L (ref 21–32)
CREAT SERPL-MCNC: 1.5 MG/DL (ref 0.6–1)
ERYTHROCYTE [DISTWIDTH] IN BLOOD BY AUTOMATED COUNT: 14.5 % (ref 11.5–14.5)
GFR SERPLBLD BASED ON 1.73 SQ M-ARVRAT: (no result) ML/MIN
GLUCOSE SERPL-MCNC: 115 MG/DL (ref 74–106)
MAGNESIUM SERPL-MCNC: 1.8 MG/DL (ref 1.8–2.4)
PHOSPHATE SERPL-MCNC: 5.1 MG/DL (ref 2.5–4.9)
PLATELET # BLD: 184 X10^3MCL (ref 130–400)
POTASSIUM SERPL-SCNC: 4.3 MMOL/L (ref 3.5–5.1)
PROT SERPL-MCNC: 5.6 G/DL (ref 6.4–8.2)
SODIUM SERPL-SCNC: 141 MMOL/L (ref 136–145)
